# Patient Record
Sex: FEMALE | Race: AMERICAN INDIAN OR ALASKA NATIVE
[De-identification: names, ages, dates, MRNs, and addresses within clinical notes are randomized per-mention and may not be internally consistent; named-entity substitution may affect disease eponyms.]

---

## 2020-08-20 ENCOUNTER — HOSPITAL ENCOUNTER (INPATIENT)
Dept: HOSPITAL 5 - LD | Age: 20
LOS: 4 days | Discharge: HOME | End: 2020-08-24
Attending: OBSTETRICS & GYNECOLOGY | Admitting: OBSTETRICS & GYNECOLOGY
Payer: MEDICAID

## 2020-08-20 DIAGNOSIS — K21.9: ICD-10-CM

## 2020-08-20 DIAGNOSIS — E66.9: ICD-10-CM

## 2020-08-20 DIAGNOSIS — O61.0: ICD-10-CM

## 2020-08-20 DIAGNOSIS — Z3A.41: ICD-10-CM

## 2020-08-20 DIAGNOSIS — O48.0: Primary | ICD-10-CM

## 2020-08-20 LAB
HCT VFR BLD CALC: 32.2 % (ref 30.3–42.9)
HGB BLD-MCNC: 10.8 GM/DL (ref 10.1–14.3)
MCHC RBC AUTO-ENTMCNC: 34 % (ref 30–34)
MCV RBC AUTO: 83 FL (ref 79–97)
PLATELET # BLD: 212 K/MM3 (ref 140–440)
RBC # BLD AUTO: 3.89 M/MM3 (ref 3.65–5.03)

## 2020-08-20 PROCEDURE — 86900 BLOOD TYPING SEROLOGIC ABO: CPT

## 2020-08-20 PROCEDURE — 3E0P7VZ INTRODUCTION OF HORMONE INTO FEMALE REPRODUCTIVE, VIA NATURAL OR ARTIFICIAL OPENING: ICD-10-PCS | Performed by: OBSTETRICS & GYNECOLOGY

## 2020-08-20 PROCEDURE — 85018 HEMOGLOBIN: CPT

## 2020-08-20 PROCEDURE — 86901 BLOOD TYPING SEROLOGIC RH(D): CPT

## 2020-08-20 PROCEDURE — 85027 COMPLETE CBC AUTOMATED: CPT

## 2020-08-20 PROCEDURE — 36415 COLL VENOUS BLD VENIPUNCTURE: CPT

## 2020-08-20 PROCEDURE — 86850 RBC ANTIBODY SCREEN: CPT

## 2020-08-20 PROCEDURE — 85014 HEMATOCRIT: CPT

## 2020-08-20 PROCEDURE — 86592 SYPHILIS TEST NON-TREP QUAL: CPT

## 2020-08-20 PROCEDURE — A6250 SKIN SEAL PROTECT MOISTURIZR: HCPCS

## 2020-08-20 RX ADMIN — SODIUM CHLORIDE, SODIUM LACTATE, POTASSIUM CHLORIDE, AND CALCIUM CHLORIDE SCH MLS/HR: .6; .31; .03; .02 INJECTION, SOLUTION INTRAVENOUS at 17:30

## 2020-08-20 NOTE — HISTORY AND PHYSICAL REPORT
History of Present Illness


Date of examination: 20


Date of admission: 


20 08:44





Chief complaint: 


Here for induction secondary to post-dates pregnancy.


History of present illness: 


Pt is a 19 yo  at 41w4d EGA who presents for induction of labor secondary to

post-dates gestation. She has received prenatal care with [facility]. Her pregnancy has been uncomplicated. She reports positive fetal 

movement and denies contractions, leakage of fluid, or vaginal bleeding. She is 

GBS negative.





Past History


Past Medical History: no pertinent history


Past Surgical History: no surgical history


Family/Genetic History: none


Social history: no significant social history





- Obstetrical History


Expected Date of Delivery: 20


Actual Gestation: 41 Week(s) 4 Day(s) 


: 1


Para: 0


Hx # Term Pregnancies: 0


Number of  Pregnancies: 0


Spontaneous Abortions: 0


Induced : 0


Number of Living Children: 0





Medications and Allergies


                                    Allergies











Allergy/AdvReac Type Severity Reaction Status Date / Time


 


No Known Allergies Allergy   Verified 17 21:33











Active Meds: 


Active Medications





Butorphanol Tartrate (Stadol)  1 mg IV Q2H PRN


   PRN Reason: Pain, Moderate(4-6) LABOR PAIN


Butorphanol Tartrate (Stadol)  2 mg IV Q2H PRN


   PRN Reason: Pain , Severe (7-10)


Ephedrine Sulfate (Ephedrine Sulfate)  10 mg IV Q2M PRN


   PRN Reason: Hypotension


Fentanyl (Sublimaze)  100 mcg IV Q2H PRN


   PRN Reason: Pain,Severe (7-10) LABOR PAIN


Oxytocin/Sodium Chloride (Pitocin/Ns 20 Unit/1000ml Drip)  20 units in 1,000 mls

@ 125 mls/hr IV AS DIRECT NITA


Oxytocin/Sodium Chloride (Pitocin/Ns 30 Unit/500ml)  30 units in 500 mls @ 1 

mls/hr IV TITR NITA; Protocol


Lactated Ringer's (Lactated Ringers)  1,000 mls @ 125 mls/hr IV AS DIRECT NITA


Mineral Oil (Mineral Oil)  30 ml PO QHS PRN


   PRN Reason: Constipation


Misoprostol (Cytotec)  25 mcg PO Q4H NITA


   Stop: 20 22:01


   Last Admin: 20 12:27 Dose:  25 mcg


   Documented by: 


Naloxone HCl (Naloxone)  0.1 mg IV Q2MIN PRN


   PRN Reason: Res Rate </= 8 or 02 SAT < 92%


Ondansetron HCl (Zofran)  4 mg IV Q8H PRN


   PRN Reason: Nausea And Vomiting


Promethazine HCl (Phenergan)  25 mg PO Q6H PRN


   PRN Reason: Nausea And Vomiting


Terbutaline Sulfate (Brethine)  0.25 mg SUB-Q ONCE PRN


   PRN Reason: Hyperstimulation/Hypertonicity


Terbutaline Sulfate (Brethine)  0.25 mg IVP ONCE PRN


   PRN Reason: Hyperstimulation/Hypertonicity











Review of Systems


All systems: negative


Genitourinary: no vaginal bleeding, no vaginal discharge, no leakage of fluid, 

no contractions





- Vital Signs


Vital signs: 


                                   Vital Signs











Temp Pulse Resp BP Pulse Ox


 


 98.5 F   87   16   100/55   98 


 


 20 09:25  20 09:25  20 09:25  20 09:25  20 09:25








                                        











Temp Pulse Resp BP Pulse Ox


 


 98.5 F   79   16   101/61   99 


 


 20 09:25  20 12:44  20 09:25  20 12:02  20 12:44














- Physical Exam


Lungs: Positive: Normal air movement


Abdomen: Positive: soft


Uterus: Positive: enlarged (gravid, EFW 6.5lb)


Extremities: Positive: normal





- Obstetrical


FHR: category 1


Uterine Contraction Monitor Mode: External


Cervical Dilatation: 0 (per RN)


Cervical Effacement Percentage: 0


Fetal station: -4


Uterine Contraction Pattern: Irregular


Uterine Tone Measurement Phase: Contraction


Uterine Contraction Intensity: Mild





Results


Result Diagrams: 


                                 20 09:44





All other labs normal.








Assessment and Plan


A:


19 yo  at 41w4d EGA


Late term pregnancy


GBS negative


Membranes intact





P:


Admit to L&D for induction of labor


Cervical ripening with Cytotec


Anticipate

## 2020-08-21 NOTE — PROGRESS NOTE
Assessment and Plan





A: IUP at 41w5d undergoing induction of labor; s/p 4 doses of misoprostol 


P: Pt allowed to eat a regular meal then restart cervical ripening with cervidil





Subjective





- Subjective


Date of service: 08/21/20


Principal diagnosis: undergoing induction of labor at 41 wks 


Interval history: 





Pt somewhat upset that she has not delivered yet, and she and her partner 

thought this process would take only one day. Expectations reviewed for 

induction process. Pt's cervix remains closed after 4 doses of cytotec. 


Patient reports: fetal movement normal, contractions, no new complaints, no loss

of fluid, no vaginal bleeding





Objective





- Vital Signs


Vital Signs: 


                               Vital Signs - 12hr











  08/21/20 08/21/20 08/21/20





  03:40 03:45 04:00


 


Temperature   97.9 F


 


Pulse Rate 73 72 


 


Blood Pressure   


 


O2 Sat by Pulse 97 97 





Oximetry   














  08/21/20 08/21/20 08/21/20





  04:56 05:01 05:03


 


Temperature   


 


Pulse Rate 82 77 78


 


Blood Pressure   125/65


 


O2 Sat by Pulse 96 96 





Oximetry   














  08/21/20 08/21/20 08/21/20





  05:06 05:11 05:16


 


Temperature   


 


Pulse Rate 75 73 80


 


Blood Pressure   


 


O2 Sat by Pulse 96 96 95





Oximetry   














  08/21/20 08/21/20 08/21/20





  05:21 05:26 05:31


 


Temperature   


 


Pulse Rate 79 74 80


 


Blood Pressure   


 


O2 Sat by Pulse 97 95 95





Oximetry   














  08/21/20 08/21/20 08/21/20





  05:36 05:41 05:46


 


Temperature   


 


Pulse Rate 78 82 79


 


Blood Pressure   


 


O2 Sat by Pulse 95 95 95





Oximetry   














  08/21/20 08/21/20 08/21/20





  05:51 05:56 06:01


 


Temperature   


 


Pulse Rate 79 81 81


 


Blood Pressure   


 


O2 Sat by Pulse 95 95 95





Oximetry   














  08/21/20 08/21/20 08/21/20





  06:04 06:16 06:21


 


Temperature   


 


Pulse Rate 73 73 62


 


Blood Pressure 112/70  


 


O2 Sat by Pulse  98 96





Oximetry   














  08/21/20 08/21/20 08/21/20





  06:26 06:31 06:36


 


Temperature   


 


Pulse Rate 55 L 68 56 L


 


Blood Pressure   


 


O2 Sat by Pulse 98 97 96





Oximetry   














  08/21/20 08/21/20 08/21/20





  06:41 06:46 06:51


 


Temperature   


 


Pulse Rate 79 68 67


 


Blood Pressure   


 


O2 Sat by Pulse 95 95 95





Oximetry   














  08/21/20 08/21/20 08/21/20





  06:56 07:01 07:04


 


Temperature   


 


Pulse Rate 65 80 71


 


Blood Pressure   99/50


 


O2 Sat by Pulse 96 95 





Oximetry   














  08/21/20 08/21/20 08/21/20





  07:06 07:11 07:16


 


Temperature   


 


Pulse Rate 62 76 77


 


Blood Pressure   


 


O2 Sat by Pulse 95 96 94





Oximetry   














  08/21/20 08/21/20 08/21/20





  07:21 07:26 07:31


 


Temperature   


 


Pulse Rate 78 72 65


 


Blood Pressure   


 


O2 Sat by Pulse 94 95 97





Oximetry   














  08/21/20 08/21/20 08/21/20





  07:36 07:52 07:57


 


Temperature   


 


Pulse Rate 72 78 76


 


Blood Pressure   


 


O2 Sat by Pulse 95 96 97





Oximetry   














  08/21/20 08/21/20 08/21/20





  08:02 08:03 08:07


 


Temperature   


 


Pulse Rate 77 77 71


 


Blood Pressure  183/86 


 


O2 Sat by Pulse 97  97





Oximetry   














  08/21/20 08/21/20 08/21/20





  08:12 08:17 08:22


 


Temperature   


 


Pulse Rate 66 68 61


 


Blood Pressure   


 


O2 Sat by Pulse 96 96 96





Oximetry   














  08/21/20 08/21/20 08/21/20





  08:27 08:32 08:37


 


Temperature   


 


Pulse Rate 61 71 63


 


Blood Pressure   


 


O2 Sat by Pulse 96 97 95





Oximetry   














  08/21/20 08/21/20 08/21/20





  08:40 08:42 08:47


 


Temperature   


 


Pulse Rate 70 72 82


 


Blood Pressure 92/51  


 


O2 Sat by Pulse  96 98





Oximetry   














  08/21/20 08/21/20 08/21/20





  08:52 09:09 09:14


 


Temperature   


 


Pulse Rate 83 70 75


 


Blood Pressure   


 


O2 Sat by Pulse 97 96 95





Oximetry   














  08/21/20 08/21/20 08/21/20





  09:19 09:24 09:29


 


Temperature   


 


Pulse Rate 74 74 62


 


Blood Pressure   


 


O2 Sat by Pulse 94 95 96





Oximetry   














  08/21/20 08/21/20 08/21/20





  09:34 09:39 09:44


 


Temperature   


 


Pulse Rate 69 65 69


 


Blood Pressure   


 


O2 Sat by Pulse 96 95 95





Oximetry   














  08/21/20 08/21/20 08/21/20





  09:49 09:54 09:59


 


Temperature   


 


Pulse Rate 69 76 79


 


Blood Pressure   


 


O2 Sat by Pulse 94 97 96





Oximetry   














  08/21/20 08/21/20 08/21/20





  10:02 10:04 10:09


 


Temperature   


 


Pulse Rate 84 89 76


 


Blood Pressure 107/67  


 


O2 Sat by Pulse  95 97





Oximetry   














  08/21/20 08/21/20 08/21/20





  10:14 10:19 10:37


 


Temperature   


 


Pulse Rate 75 81 79


 


Blood Pressure   


 


O2 Sat by Pulse 97 97 98





Oximetry   














  08/21/20 08/21/20 08/21/20





  10:42 10:47 10:52


 


Temperature   


 


Pulse Rate 83 79 78


 


Blood Pressure   


 


O2 Sat by Pulse 97 96 99





Oximetry   














  08/21/20 08/21/20 08/21/20





  10:57 11:02 11:03


 


Temperature   


 


Pulse Rate 76 81 80


 


Blood Pressure   104/67


 


O2 Sat by Pulse 99 99 





Oximetry   














  08/21/20 08/21/20 08/21/20





  11:07 11:12 11:17


 


Temperature   


 


Pulse Rate 83 85 83


 


Blood Pressure   


 


O2 Sat by Pulse 99 100 100





Oximetry   














  08/21/20 08/21/20 08/21/20





  11:22 11:27 11:32


 


Temperature   


 


Pulse Rate 78 84 74


 


Blood Pressure   


 


O2 Sat by Pulse 100 99 98





Oximetry   














  08/21/20 08/21/20 08/21/20





  12:10 12:15 12:20


 


Temperature   


 


Pulse Rate 71 70 76


 


Blood Pressure   


 


O2 Sat by Pulse 98 99 96





Oximetry   














  08/21/20 08/21/20 08/21/20





  12:25 12:30 12:35


 


Temperature   


 


Pulse Rate 72 71 76


 


Blood Pressure   


 


O2 Sat by Pulse 99 99 99





Oximetry   














  08/21/20 08/21/20 08/21/20





  12:40 12:45 12:50


 


Temperature   


 


Pulse Rate 72 78 80


 


Blood Pressure   


 


O2 Sat by Pulse 100 99 99





Oximetry   














  08/21/20 08/21/20 08/21/20





  12:55 13:00 13:02


 


Temperature   


 


Pulse Rate 76 71 68


 


Blood Pressure   106/68


 


O2 Sat by Pulse 100 98 





Oximetry   














  08/21/20





  13:05


 


Temperature 


 


Pulse Rate 81


 


Blood Pressure 


 


O2 Sat by Pulse 100





Oximetry 














- Exam


Breasts: deferred


FHR: auscultation normal


Uterine Contraction Monitor Mode: External


Uterine Contraction Pattern: Irregular

## 2020-08-22 LAB
HCT VFR BLD CALC: 35.7 % (ref 30.3–42.9)
HGB BLD-MCNC: 12.1 GM/DL (ref 10.1–14.3)
MCHC RBC AUTO-ENTMCNC: 34 % (ref 30–34)
MCV RBC AUTO: 82 FL (ref 79–97)
PLATELET # BLD: 223 K/MM3 (ref 140–440)
RBC # BLD AUTO: 4.36 M/MM3 (ref 3.65–5.03)

## 2020-08-22 RX ADMIN — AMPICILLIN SCH MLS/HR: 2 INJECTION, POWDER, FOR SOLUTION INTRAVENOUS at 11:19

## 2020-08-22 RX ADMIN — SODIUM CHLORIDE, SODIUM LACTATE, POTASSIUM CHLORIDE, AND CALCIUM CHLORIDE SCH MLS/HR: .6; .31; .03; .02 INJECTION, SOLUTION INTRAVENOUS at 09:41

## 2020-08-22 RX ADMIN — AMPICILLIN SCH MLS/HR: 2 INJECTION, POWDER, FOR SOLUTION INTRAVENOUS at 15:33

## 2020-08-22 RX ADMIN — SODIUM CHLORIDE, SODIUM LACTATE, POTASSIUM CHLORIDE, AND CALCIUM CHLORIDE SCH MLS/HR: .6; .31; .03; .02 INJECTION, SOLUTION INTRAVENOUS at 18:48

## 2020-08-22 NOTE — PROGRESS NOTE
Assessment and Plan


A:


21 yo  at 41w6d EGA


Late term pregnancy


GBS negative


Membranes ruptured x28 hours


No clinical signs of intraamniotic infection





P:


Continue cervical ripening- Cytotec PV


Closely monitor clinical status


Anticipate 





Subjective





- Subjective


Date of service: 20


Principal diagnosis: undergoing induction of labor at 41 wks 


Interval history: 


HD3 of IOL for post-dates gestation. She has received 5 doses of Cytotec and 1 

dose of Cervidil, removed early.


Patient reports: loss of fluid, contractions, no new complaints, no vaginal 

bleeding





Objective





- Vital Signs


Vital Signs: 


                               Vital Signs - 12hr











  20





  19:56 20:01 20:06


 


Temperature   


 


Pulse Rate 89 66 81


 


Respiratory   





Rate   


 


Blood Pressure   


 


Blood Pressure   





[Right]   


 


O2 Sat by Pulse 96 95 95





Oximetry   














  20





  20:11 20:16 20:21


 


Temperature   


 


Pulse Rate 68 72 74


 


Respiratory   





Rate   


 


Blood Pressure 93/56  


 


Blood Pressure   





[Right]   


 


O2 Sat by Pulse 98 95 95





Oximetry   














  20





  20:26 20:31 20:36


 


Temperature  98.3 F 


 


Pulse Rate 71 71 68


 


Respiratory   





Rate   


 


Blood Pressure   


 


Blood Pressure   





[Right]   


 


O2 Sat by Pulse 96 96 95





Oximetry   














  20





  20:41 20:46 20:51


 


Temperature   


 


Pulse Rate 77 75 74


 


Respiratory   





Rate   


 


Blood Pressure   


 


Blood Pressure   





[Right]   


 


O2 Sat by Pulse 95 96 98





Oximetry   














  20





  20:54 20:56 21:01


 


Temperature   


 


Pulse Rate 75 74 67


 


Respiratory   





Rate   


 


Blood Pressure   


 


Blood Pressure   





[Right]   


 


O2 Sat by Pulse 88 91 96





Oximetry   














  20





  21:07 21:11 21:12


 


Temperature   


 


Pulse Rate 66 64 63


 


Respiratory   





Rate   


 


Blood Pressure  98/56 


 


Blood Pressure   





[Right]   


 


O2 Sat by Pulse 95  95





Oximetry   














  20





  21:17 21:22 00:00


 


Temperature   98.3 F


 


Pulse Rate 72 66 


 


Respiratory   





Rate   


 


Blood Pressure   


 


Blood Pressure   





[Right]   


 


O2 Sat by Pulse 93 96 





Oximetry   














  20





  00:02 07:06 07:21


 


Temperature  97.9 F 


 


Pulse Rate 63  36 L


 


Respiratory  18 





Rate   


 


Blood Pressure 104/59  


 


Blood Pressure   





[Right]   


 


O2 Sat by Pulse   95





Oximetry   














  20





  07:22


 


Temperature 


 


Pulse Rate 63


 


Respiratory 





Rate 


 


Blood Pressure 112/64


 


Blood Pressure 112/64





[Right] 


 


O2 Sat by Pulse 97





Oximetry 














- Exam


Lungs: Normal air movement


FHR: category 1


Uterine Contraction Monitor Mode: External


Cervical Dilatation: 1 (per RN at 2100 on )


Cervical Effacement Percentage: 50


Fetal station: -3


Uterine Contraction Pattern: Irregular

## 2020-08-22 NOTE — PROCEDURE NOTE
OB Delivery Note





- Delivery


Date of Delivery: 20


Surgeon: HAYDE BARTHOLOMEW


Estimated blood loss: other (700ml)





-  Section


Preop diagnosis: arrest of dilation, nonreassuring FHR tracing


Postop diagnosis: same


 section procedure:  section, primary low transverse


Disposition: PACU


Complications: none





- Infant


  ** A


Apgar at 1 minute: 8


Apgar at 5 minutes: 9


Infant Gender: Male (Weight 8 pounds 3 ounces)

## 2020-08-22 NOTE — PROGRESS NOTE
Assessment and Plan


A:


19 yo  at 41w6d EGA


Late term pregnancy


GBS negative


Non-reassuring FHT


Failed induction of labor


Membranes ruptured x39 hours


No clinical signs of intraamniotic infection





P:


Discussed recommendation of primary  section. Pt and partner express 

understanding and agree with plan.


Dr. Coreas notified and en route.





Subjective





- Subjective


Date of service: 20


Principal diagnosis: undergoing induction of labor at 41 wks 


Interval history: 


HD3 of IOL for post-dates gestation. Currently on Pitocin 12 mIU/min, 

uncomfortable with contractions. Recurrent late decelerations noted. Cervix 

unchanged. Membranes ruptured x39 hours. 


Patient reports: loss of fluid, contractions, no new complaints, no vaginal 

bleeding





Objective





- Vital Signs


Vital Signs: 


                               Vital Signs - 12hr











  20





  07:06 07:21 07:22


 


Temperature 97.9 F  


 


Pulse Rate  36 L 63


 


Respiratory 18  





Rate   


 


Blood Pressure   112/64


 


Blood Pressure   112/64





[Right]   


 


O2 Sat by Pulse  95 97





Oximetry   














  20





  11:39 12:09 12:39


 


Temperature   


 


Pulse Rate 89 66 62


 


Respiratory   





Rate   


 


Blood Pressure 89/52 119/76 115/76


 


Blood Pressure   





[Right]   


 


O2 Sat by Pulse   





Oximetry   














  20





  13:39 14:46 15:34


 


Temperature   97.9 F


 


Pulse Rate 96 H 57 L 60


 


Respiratory   16





Rate   


 


Blood Pressure 119/83 94/63 


 


Blood Pressure   93/51





[Right]   


 


O2 Sat by Pulse   96





Oximetry   














  20





  15:36 15:40 15:41


 


Temperature   


 


Pulse Rate 59 L 58 L 80


 


Respiratory   





Rate   


 


Blood Pressure 93/51 84/50 


 


Blood Pressure   





[Right]   


 


O2 Sat by Pulse 95  97





Oximetry   














  20





  15:46 15:57 16:02


 


Temperature   


 


Pulse Rate 59 L 68 56 L


 


Respiratory   





Rate   


 


Blood Pressure   


 


Blood Pressure   





[Right]   


 


O2 Sat by Pulse 100 99 98





Oximetry   














  20





  16:07 16:09 16:12


 


Temperature   


 


Pulse Rate 57 L 67 56 L


 


Respiratory   





Rate   


 


Blood Pressure  91/53 


 


Blood Pressure   





[Right]   


 


O2 Sat by Pulse 99  99





Oximetry   














  20





  16:17 16:22 16:27


 


Temperature   


 


Pulse Rate 79 61 63


 


Respiratory   





Rate   


 


Blood Pressure   


 


Blood Pressure   





[Right]   


 


O2 Sat by Pulse 99 98 99





Oximetry   














  20





  16:38 16:39 16:44


 


Temperature   


 


Pulse Rate 69 68 75


 


Respiratory   





Rate   


 


Blood Pressure 113/74  


 


Blood Pressure   





[Right]   


 


O2 Sat by Pulse  99 98





Oximetry   














  20





  16:49 16:54 16:59


 


Temperature   


 


Pulse Rate 75 61 60


 


Respiratory   





Rate   


 


Blood Pressure   


 


Blood Pressure   





[Right]   


 


O2 Sat by Pulse 98 97 97





Oximetry   














  20





  17:04 17:40 18:39


 


Temperature   


 


Pulse Rate 56 L 56 L 63


 


Respiratory   





Rate   


 


Blood Pressure  100/56 115/82


 


Blood Pressure   





[Right]   


 


O2 Sat by Pulse 91  





Oximetry   














- Exam


Abdomen: Present: soft


FHR: category 3 (recurrent late decelerations)


Uterine Contraction Monitor Mode: External


Cervical Dilatation: 1


Cervical Effacement Percentage: 85


Fetal station: -2


Uterine Contraction Frequency (min): 3


Uterine Contraction Pattern: Regular


Uterine Tone Measurement Phase: Contraction


Uterine Contraction Intensity: Moderate





- Labs


Labs: 


                         Laboratory Results - last 24 hr











  20





  13:48


 


WBC  8.2


 


RBC  4.36


 


Hgb  12.1


 


Hct  35.7


 


MCV  82


 


MCH  28


 


MCHC  34


 


RDW  14.2


 


Plt Count  223

## 2020-08-22 NOTE — ANESTHESIA CONSULTATION
Anesthesia Consult and Med Hx


Date of service: 08/22/20





- Airway


Anesthetic Teeth Evaluation: Good


ROM Head & Neck: Adequate


Mental/Hyoid Distance: Adequate


Mallampati Class: Class II


Intubation Access Assessment: Good





- Pulmonary Exam


CTA: Yes





- Cardiac Exam


Cardiac Exam: RRR





- Pre-Operative Health Status


ASA Pre-Surgery Classification: ASA2


Proposed Anesthetic Plan: Spinal





- Pulmonary


Hx Smoking: No


Hx Asthma: No


Hx Respiratory Symptoms: No


SOB: No


COPD: No


Home Oxygen Therapy: No


Hx Pneumonia: No


Hx Sleep Apnea: No





- Cardiovascular System


Hx Hypertension: No


Hx Coronary Artery Disease: No


Hx Heart Attack/AMI: No


Hx Angina: No


Hx Percutaneous Transluminal Coronary Angioplasty (PTCA): No


Hx Cardia Arrhythmia: No


Hx Pacemaker: No


Hx Internal Defibrillator: No


Hx Valvular Heart Disease: No


Hx Heart Murmur: No


Hx Peripheral Vascular Disease: No





- Central Nervous System


Hx Neuromuscular Disorder: No


Hx Seizures: No


CVA: No


Hx Back Pain: Yes (Bilateral sciatica nerve pain during pregnancy)


Hx Psychiatric Problems: No





- Gastrointestinal


Hx Ulcer: No


Hx Gastroesophageal Reflux Disease: Yes (nausia and vomiting pre-op)





- Endocrine


Hx Renal Disease: No


Hx End Stage Renal Disease: No


Hx Cirrhosis: No


Hx Liver Disease: No


Hx Insulin Dependent Diabetes: No


Hx Non-Insulin Dependent Diabetes: No


Hx Thyroid Disease: No


Hx Hypothyroidism: No


Hx Hyperthyroidism: No





- Hematic


Hx Anemia: No


Hx Sickle Cell Disease: No





- Other Systems


Hx Alcohol Use: No


Hx Substance Use: No


Hx Cancer: No


Hx Obesity: Yes





- Additional Comments


Anesthesia Medical History Comments: risk, bennifets and alternitive of 

anesthesia discussed at [facility] with patient and family member. All questions and

conceres were answered

## 2020-08-22 NOTE — ANESTHESIA DAY OF SURGERY
Anesthesia Day of Surgery





- Day of Surgery


Patient Examined: Yes


Patient H&P Reviewed: Yes


Patient is NPO: Yes


Beta Blockers: No


Cardiac Clearance: No


Pulmonary Clearance: No


Tyshawn's Test: N/A

## 2020-08-22 NOTE — EVENT NOTE
Date: 20





20-year-old G1, P0 at 41+6 admitted for postdates induction.  Her intrapartum 

course is been complicated by failed induction and nonreassuring fetal heart 

rate tracing.  The patient is counseled for primary  delivery.

## 2020-08-23 LAB
HCT VFR BLD CALC: 26.7 % (ref 30.3–42.9)
HGB BLD-MCNC: 9.3 GM/DL (ref 10.1–14.3)

## 2020-08-23 RX ADMIN — OXYCODONE AND ACETAMINOPHEN PRN TAB: 5; 325 TABLET ORAL at 10:31

## 2020-08-23 RX ADMIN — KETOROLAC TROMETHAMINE PRN MG: 30 INJECTION, SOLUTION INTRAMUSCULAR at 01:22

## 2020-08-23 RX ADMIN — OXYCODONE AND ACETAMINOPHEN PRN TAB: 5; 325 TABLET ORAL at 22:44

## 2020-08-23 RX ADMIN — IBUPROFEN PRN MG: 800 TABLET, FILM COATED ORAL at 16:55

## 2020-08-23 RX ADMIN — KETOROLAC TROMETHAMINE PRN MG: 30 INJECTION, SOLUTION INTRAMUSCULAR at 08:12

## 2020-08-23 NOTE — PROGRESS NOTE
Assessment and Plan


A:


POD1 s/p pLTCS


BP low, asymptomatic


Awaiting pp H&H


Need for breast feeding education





P:


Initiate regular diet


Closely monitor clinical status


Breast feeding education





Subjective





- Subjective


Date of service: 20


Principal diagnosis: s/p primary LTCS


Interval history: 


POD1 s/p primary LTCS for non-reassuring FHT and failed induction of labor


Patient reports: appetite normal, voiding normally, pain well controlled, 

flatus, ambulating normally


: doing well, nursing well (nipples sore)





Objective





- Vital Signs


Latest vital signs: 


                                   Vital Signs











  Temp Pulse Resp BP BP Pulse Ox


 


 20 08:27  98.1 F  74  20   96/56  96


 


 20 04:37  98.2 F  70  20  91/49   96


 


 20 22:45  98.6 F  67  16  101/54   96


 


 20 22:30   65  16  99/53   100


 


 20 22:15   60  18  98/55   100


 


 20 22:00   61  14  91/49   96


 


 20 21:45   80  18  106/26   98


 


 20 21:40   77  14  90/51   95


 


 20 21:35   76  16  83/41   98


 


 20 21:30   69  16  96/75   97


 


 20 21:24  98.6 F  64  16  77/27   97


 


 20 19:35  98.3 F     


 


 20 18:39   63   115/82  


 


 20 17:40   56 L   100/56  


 


 20 17:04   56 L     91


 


 20 16:59   60     97


 


 20 16:54   61     97


 


 20 16:49   75     98


 


 20 16:44   75     98


 


 20 16:39   68     99


 


 20 16:38   69   113/74  


 


 20 16:27   63     99


 


 20 16:22   61     98


 


 20 16:17   79     99


 


 20 16:12   56 L     99


 


 20 16:09   67   91/53  


 


 20 16:07   57 L     99


 


 20 16:02   56 L     98


 


 20 15:57   68     99


 


 20 15:46   59 L     100


 


 08/22/20 15:41   80     97


 


 20 15:40   58 L   84/50  


 


 20 15:36   59 L   93/51   95


 


 20 15:34  97.9 F  60  16   93/51  96


 


 20 14:46   57 L   94/63  


 


 20 13:39   96 H   119/83  


 


 20 12:39   62   115/76  


 


 20 12:09   66   119/76  


 


 20 11:39   89   89/52  








                                Intake and Output











 20





 23:59 07:59 15:59


 


Intake Total 3888.4 240 


 


Output Total 500 600 


 


Balance 3388.4 -360 


 


Intake:   


 


  IV 3888.4  


 


    Lactated Ringers 1,000 ml 1000  





    @ 125 mls/hr IV AS   





    DIRECT NITA Rx#:[id number]   


 


    PITOCin/NS 30 UNIT/500ML 38.4  





    30 units In 500 ml @ 4   





    MILLIUNITS/MIN 4 mls/hr   





    IV TITR NITA Rx#:[id number]   


 


  Oral  240 


 


Output:   


 


  Urine 500 600 


 


    Indwelling Catheter  600 


 


Other:   


 


  Total, Intake Amount  240 


 


  Total, Output Amount  600 


 


  Estimated Blood Loss 700  














- Exam


Lungs: Present: Normal air movement


Abdomen: Present: soft.  Absent: distention


Uterus: Present: firm, fundal height below umbilicus.  Absent: bogginess


Extremities: Present: normal


Incision: Present: dressed

## 2020-08-24 VITALS — DIASTOLIC BLOOD PRESSURE: 59 MMHG | SYSTOLIC BLOOD PRESSURE: 120 MMHG

## 2020-08-24 RX ADMIN — OXYCODONE AND ACETAMINOPHEN PRN TAB: 5; 325 TABLET ORAL at 09:01

## 2020-08-24 RX ADMIN — IBUPROFEN PRN MG: 800 TABLET, FILM COATED ORAL at 06:10

## 2020-08-24 NOTE — DISCHARGE SUMMARY
Providers





- Providers


Date of Admission: 


20 08:44





Date of discharge: 20


Attending physician: 


BILLIE ESCOBEDO





Primary care physician: 


HAYDE BARTHOLOMEW








Hospitalization


Reason for admission: induction of labor


Delivery: 


Procedure:  section, primary low transverse


Incision: normal


Discharge diagnosis: IUP at term delivered


[place] baby: male


Hospital course: 





The patient was admitted for induction of labor secondary to postterm pregnancy.

 The patient had a failed induction with evidence of nonreassuring fetal tracing

at the conclusion.  She underwent a primary  delivery.  Her postpartum 

course was uneventful.


Condition at discharge: Good


Disposition: DC-01 TO HOME OR SELFCARE





- Discharge Diagnoses


(1)  delivery delivered


Status: Acute   





Plan





- Discharge Medications


Prescriptions: 


Ibuprofen [Motrin] 800 mg PO Q8HR PRN #60 tablet


 PRN Reason: Pain , Severe (7-10)


oxyCODONE /ACETAMINOPHEN [Percocet 5/325] 1 tab PO Q6HR PRN #30 tablet


 PRN Reason: Pain





- Provider Discharge Summary


Activity: no sex for 6 weeks, no heavy lifting 4 weeks, no strenuous exercise


Diet: routine


Instructions: routine


Additional instructions: 


[]  Smoking cessation referral if applicable(refer to patient education folder 

for contact #)


[]  Refer to [facility]'s [facility] Booklet








Call your doctor immediately for:


* Fever > 100.5


* Heavy vaginal bleeding ( >1 pad per hour)


* Severe persistent headache


* Shortness of breath


* Reddened, hot, painful area to leg or breast


* Drainage or odor from incision.





* Keep incision clean and dry at all times and follow doctor's instructions 

regarding bathing/showering





Schedule incision check in 2 weeks





- Follow up plan


Forms:  [facility] Discharge Summary

## 2020-08-24 NOTE — PROGRESS NOTE
Assessment and Plan





- Patient Problems


(1)  delivery delivered


Current Visit: Yes   Status: Acute   


Plan to address problem: 


Patient doing well


Discharge home








Subjective





- Subjective


Date of service: 20


Principal diagnosis: s/p primary LTCS


Interval history: 





The patient denies any significant complaints.  She is breast-feeding without 

difficulty.  Her pain is well controlled.


Patient reports: appetite normal, voiding normally, pain well controlled


[place]: doing well, nursing well





Objective





- Vital Signs


Latest vital signs: 


                                   Vital Signs











  Temp Pulse Resp BP BP Pulse Ox


 


 20 01:07  98.3 F  85  18  106/59   96


 


 20 21:08  98.4 F  86  18  98/60   97


 


 20 16:44  98.4 F  74  16  95/52   98


 


 20 12:00  97.9 F  60  20  98/60   97


 


 20 08:27  98.1 F  74  20   96/56  96








                                Intake and Output











 20





 22:59 06:59 14:59


 


Intake Total 120 360 


 


Output Total 1300  


 


Balance -1180 360 


 


Intake:   


 


  Oral 120  


 


  Intake, Free Water  360 


 


Output:   


 


  Urine 1300  


 


    Void 1300  


 


Other:   


 


  Total, Intake Amount 120  


 


  Total, Output Amount 600  


 


  # Voids   


 


    Void  2 














- Exam


Abdomen: Present: normal appearance, soft





- Labs


Labs: 


                              Abnormal lab results











  20 Range/Units





  16:36 


 


Hgb  9.3 L  (10.1-14.3)  gm/dl


 


Hct  26.7 L D  (30.3-42.9)  %

## 2025-06-15 NOTE — OPERATIVE REPORT
Operative Report


Operative Report: 


Date of surgery: 2020


 


Preoperative diagnosis: Pregnancy at 41+6 weeks; nonreassuring fetal heart rate 

tracing; failed induction


 


Postoperative diagnosis: Same as above


 


Procedure: Primary low transverse  delivery


 


Surgeon: Ally Edwards M.D.


 


Anesthesia: Regional


 


Estimated blood loss: 700 mL





IV fluids: 1350ml





Urine output: 250 mL


 


Findings: Liveborn male infant with Apgars of 8 and 9 weight 8 pounds 3 ounces


 


Indications: 20-year-old G1, P0 at 41+6 weeks who was admitted for postdates 

induction.  Her intrapartum partum course was complicated by nonreassuring fetal

heart rate tracing and arrest of dilatation at 2 cm.


 


Procedure:


     The patient was taken to the operating room and given regional anesthesia 

without complication.  She was prepped and draped in a normal sterile fashion.  

A Pfannenstiel skin incision was made down to layer the fascia which was nicked 

in the midline 


 


extended laterally with the Bovie cautery.  The superior aspect of the rectus 

fascia was grasped with Crowley clamps x2 and the rectus muscles  off 

sharply.  This was done in inferior fashion as well.  The rectus muscle 

 midline and peritoneum 


 


entered bluntly.  An Lm retractor was then inserted.  A bladder blade was 

placed.  The vesicouterine peritoneum was then entered sharply with Metzenbaum 

scissors.  A bladder flap was created digitally.  A low transverse uterine 

incision was then made and 


 


extended digitally.  There was clear fluid  upon entry into the uterine cavity. 

The fetal head was delivered through the incision with fundal pressure.  The 

cord was clamped and cut x2 and infant was passed off to pediatrics.  The 

placenta was then manually 


 


extracted.  The uterus was then exteriorized and cleared of clots and debris.  

The uterine incision was then closed in a running locked fashion with 0 Vicryl 

additional imbricating stitch was applied for 2 layer closure.  The serosa was 

then reapproximated with 3-0 


 


Vicryl.  The posterior cul-de-sac was then copiously irrigated.  The uterus was 

replaced back into the abdomen and pelvis were the gutters were then irrigated. 

The Lm retractor was then removed.  The peritoneum was then reapproximated 

with 3-0 Vicryl 


 


incorporating the rectus muscle.  The fascia was then closed with 0 Vicryl in a 

running fashion.  The skin was then reapproximated with 3-0 Monocryl on a Mario 

needle subcuticular fashion.  Steri-Strips to place across the incision and a 

Crede procedures 


 


performed at the end of the surgery.  A pressure dressing was applied to the 

incision.  The surgery productive of a liveborn male infant with Apgars of 8 and

9 weight 8 pounds 3 ounces.  The patient was taken to the recovery room in 

stable condition.  All sponge laps and needle 


 


counts correct x2.
.